# Patient Record
Sex: MALE | Race: WHITE
[De-identification: names, ages, dates, MRNs, and addresses within clinical notes are randomized per-mention and may not be internally consistent; named-entity substitution may affect disease eponyms.]

---

## 2017-06-06 ENCOUNTER — HOSPITAL ENCOUNTER (INPATIENT)
Dept: HOSPITAL 10 - FTE | Age: 32
LOS: 1 days | Discharge: HOME | DRG: 343 | End: 2017-06-07
Attending: INTERNAL MEDICINE | Admitting: SURGERY
Payer: COMMERCIAL

## 2017-06-06 VITALS — HEART RATE: 78 BPM | SYSTOLIC BLOOD PRESSURE: 115 MMHG | DIASTOLIC BLOOD PRESSURE: 72 MMHG | RESPIRATION RATE: 16 BRPM

## 2017-06-06 VITALS — SYSTOLIC BLOOD PRESSURE: 118 MMHG | RESPIRATION RATE: 18 BRPM | DIASTOLIC BLOOD PRESSURE: 75 MMHG | HEART RATE: 68 BPM

## 2017-06-06 VITALS
BODY MASS INDEX: 38.76 KG/M2 | HEIGHT: 68 IN | WEIGHT: 255.74 LBS | BODY MASS INDEX: 38.76 KG/M2 | WEIGHT: 255.74 LBS | HEIGHT: 68 IN

## 2017-06-06 VITALS — DIASTOLIC BLOOD PRESSURE: 45 MMHG | SYSTOLIC BLOOD PRESSURE: 101 MMHG | RESPIRATION RATE: 18 BRPM | HEART RATE: 88 BPM

## 2017-06-06 VITALS — RESPIRATION RATE: 17 BRPM | DIASTOLIC BLOOD PRESSURE: 41 MMHG | HEART RATE: 86 BPM | SYSTOLIC BLOOD PRESSURE: 106 MMHG

## 2017-06-06 VITALS — RESPIRATION RATE: 18 BRPM | SYSTOLIC BLOOD PRESSURE: 113 MMHG | HEART RATE: 84 BPM | DIASTOLIC BLOOD PRESSURE: 63 MMHG

## 2017-06-06 VITALS — HEART RATE: 84 BPM | RESPIRATION RATE: 17 BRPM | DIASTOLIC BLOOD PRESSURE: 45 MMHG | SYSTOLIC BLOOD PRESSURE: 100 MMHG

## 2017-06-06 VITALS — RESPIRATION RATE: 17 BRPM | DIASTOLIC BLOOD PRESSURE: 61 MMHG | HEART RATE: 84 BPM | SYSTOLIC BLOOD PRESSURE: 111 MMHG

## 2017-06-06 VITALS — DIASTOLIC BLOOD PRESSURE: 58 MMHG | RESPIRATION RATE: 16 BRPM | HEART RATE: 91 BPM | SYSTOLIC BLOOD PRESSURE: 124 MMHG

## 2017-06-06 VITALS — HEART RATE: 84 BPM | RESPIRATION RATE: 15 BRPM | DIASTOLIC BLOOD PRESSURE: 57 MMHG | SYSTOLIC BLOOD PRESSURE: 109 MMHG

## 2017-06-06 VITALS — DIASTOLIC BLOOD PRESSURE: 61 MMHG | HEART RATE: 88 BPM | RESPIRATION RATE: 19 BRPM | SYSTOLIC BLOOD PRESSURE: 108 MMHG

## 2017-06-06 VITALS — DIASTOLIC BLOOD PRESSURE: 64 MMHG | SYSTOLIC BLOOD PRESSURE: 108 MMHG | HEART RATE: 86 BPM | RESPIRATION RATE: 20 BRPM

## 2017-06-06 VITALS — HEART RATE: 84 BPM | SYSTOLIC BLOOD PRESSURE: 111 MMHG | DIASTOLIC BLOOD PRESSURE: 46 MMHG | RESPIRATION RATE: 18 BRPM

## 2017-06-06 VITALS — DIASTOLIC BLOOD PRESSURE: 58 MMHG | SYSTOLIC BLOOD PRESSURE: 111 MMHG | RESPIRATION RATE: 17 BRPM | HEART RATE: 82 BPM

## 2017-06-06 VITALS — DIASTOLIC BLOOD PRESSURE: 47 MMHG | HEART RATE: 76 BPM | RESPIRATION RATE: 17 BRPM | SYSTOLIC BLOOD PRESSURE: 100 MMHG

## 2017-06-06 VITALS — DIASTOLIC BLOOD PRESSURE: 65 MMHG | RESPIRATION RATE: 18 BRPM | SYSTOLIC BLOOD PRESSURE: 113 MMHG

## 2017-06-06 VITALS — DIASTOLIC BLOOD PRESSURE: 58 MMHG | RESPIRATION RATE: 20 BRPM | HEART RATE: 92 BPM | SYSTOLIC BLOOD PRESSURE: 119 MMHG

## 2017-06-06 VITALS — TEMPERATURE: 98.2 F

## 2017-06-06 DIAGNOSIS — K35.80: Primary | ICD-10-CM

## 2017-06-06 DIAGNOSIS — D72.829: ICD-10-CM

## 2017-06-06 DIAGNOSIS — Z87.891: ICD-10-CM

## 2017-06-06 LAB
ADD SCAN DIFF: NO
ADD UMIC: NO
ALBUMIN SERPL-MCNC: 4.9 G/DL (ref 3.3–4.9)
ALBUMIN/GLOB SERPL: 1.88 {RATIO}
ALP SERPL-CCNC: 100 IU/L (ref 42–121)
ALT SERPL-CCNC: 50 IU/L (ref 13–69)
ANION GAP SERPL CALC-SCNC: 14 MMOL/L (ref 8–16)
APTT BLD: 27.1 SEC (ref 25–35)
AST SERPL-CCNC: 21 IU/L (ref 15–46)
BASOPHILS # BLD AUTO: 0.1 10^3/UL (ref 0–0.1)
BASOPHILS NFR BLD: 0.4 % (ref 0–2)
BILIRUB DIRECT SERPL-MCNC: 0 MG/DL (ref 0–0.2)
BILIRUB SERPL-MCNC: 0.3 MG/DL (ref 0.2–1.3)
BUN SERPL-MCNC: 18 MG/DL (ref 7–20)
CALCIUM SERPL-MCNC: 9.5 MG/DL (ref 8.4–10.2)
CHLORIDE SERPL-SCNC: 105 MMOL/L (ref 97–110)
CO2 SERPL-SCNC: 25 MMOL/L (ref 21–31)
COLOR UR: (no result)
CREAT SERPL-MCNC: 0.74 MG/DL (ref 0.61–1.24)
EOSINOPHIL # BLD: 0.3 10^3/UL (ref 0–0.5)
EOSINOPHIL NFR BLD: 2.4 % (ref 0–7)
ERYTHROCYTE [DISTWIDTH] IN BLOOD BY AUTOMATED COUNT: 12 % (ref 11.5–14.5)
GLOBULIN SER-MCNC: 2.6 G/DL (ref 1.3–3.2)
GLUCOSE SERPL-MCNC: 159 MG/DL (ref 70–220)
GLUCOSE UR STRIP-MCNC: NEGATIVE %
HCT VFR BLD CALC: 45.4 % (ref 42–52)
HGB BLD-MCNC: 16.3 G/DL (ref 14–18)
INR PPP: 0.96
KETONES UR STRIP.AUTO-MCNC: NEGATIVE MG/DL
LYMPHOCYTES # BLD AUTO: 2 10^3/UL (ref 0.8–2.9)
LYMPHOCYTES NFR BLD AUTO: 14.5 % (ref 15–51)
MCH RBC QN AUTO: 30.3 PG (ref 29–33)
MCHC RBC AUTO-ENTMCNC: 35.9 G/DL (ref 32–37)
MCV RBC AUTO: 84.4 FL (ref 82–101)
MONOCYTES # BLD: 0.7 10^3/UL (ref 0.3–0.9)
MONOCYTES NFR BLD: 4.9 % (ref 0–11)
NEUTROPHILS # BLD: 10.7 10^3/UL (ref 1.6–7.5)
NEUTROPHILS NFR BLD AUTO: 77.1 % (ref 39–77)
NITRITE UR QL STRIP.AUTO: NEGATIVE
NRBC # BLD MANUAL: 0 10^3/UL (ref 0–0)
NRBC BLD QL: 0 /100WBC (ref 0–0)
PLATELET # BLD: 205 10^3/UL (ref 140–415)
PMV BLD AUTO: 10.7 FL (ref 7.4–10.4)
POTASSIUM SERPL-SCNC: 4.4 MMOL/L (ref 3.5–5.1)
PROT SERPL-MCNC: 7.5 G/DL (ref 6.1–8.1)
PROTHROMBIN TIME: 12.8 SEC (ref 12.2–14.2)
PT RATIO: 1
RBC # BLD AUTO: 5.38 10^6/UL (ref 4.7–6.1)
RBC # UR AUTO: NEGATIVE /UL
SODIUM SERPL-SCNC: 140 MMOL/L (ref 135–144)
URINE BILIRUBIN (DIP): NEGATIVE
URINE TOTAL PROTEIN (DIP): NEGATIVE
UROBILINOGEN UR STRIP-ACNC: (no result) (ref 0.1–1)
WBC # BLD AUTO: 13.9 10^3/UL (ref 4.8–10.8)
WBC # UR STRIP: NEGATIVE /UL

## 2017-06-06 PROCEDURE — 83036 HEMOGLOBIN GLYCOSYLATED A1C: CPT

## 2017-06-06 PROCEDURE — 84436 ASSAY OF TOTAL THYROXINE: CPT

## 2017-06-06 PROCEDURE — 85730 THROMBOPLASTIN TIME PARTIAL: CPT

## 2017-06-06 PROCEDURE — 85610 PROTHROMBIN TIME: CPT

## 2017-06-06 PROCEDURE — 84100 ASSAY OF PHOSPHORUS: CPT

## 2017-06-06 PROCEDURE — 83735 ASSAY OF MAGNESIUM: CPT

## 2017-06-06 PROCEDURE — 83880 ASSAY OF NATRIURETIC PEPTIDE: CPT

## 2017-06-06 PROCEDURE — 80061 LIPID PANEL: CPT

## 2017-06-06 PROCEDURE — 86900 BLOOD TYPING SEROLOGIC ABO: CPT

## 2017-06-06 PROCEDURE — 80053 COMPREHEN METABOLIC PANEL: CPT

## 2017-06-06 PROCEDURE — 74176 CT ABD & PELVIS W/O CONTRAST: CPT

## 2017-06-06 PROCEDURE — 85025 COMPLETE CBC W/AUTO DIFF WBC: CPT

## 2017-06-06 PROCEDURE — 96375 TX/PRO/DX INJ NEW DRUG ADDON: CPT

## 2017-06-06 PROCEDURE — 84484 ASSAY OF TROPONIN QUANT: CPT

## 2017-06-06 PROCEDURE — 81003 URINALYSIS AUTO W/O SCOPE: CPT

## 2017-06-06 PROCEDURE — 93005 ELECTROCARDIOGRAM TRACING: CPT

## 2017-06-06 PROCEDURE — 0DTJ0ZZ RESECTION OF APPENDIX, OPEN APPROACH: ICD-10-PCS | Performed by: SURGERY

## 2017-06-06 PROCEDURE — 71010: CPT

## 2017-06-06 PROCEDURE — 86850 RBC ANTIBODY SCREEN: CPT

## 2017-06-06 PROCEDURE — 36415 COLL VENOUS BLD VENIPUNCTURE: CPT

## 2017-06-06 PROCEDURE — 83690 ASSAY OF LIPASE: CPT

## 2017-06-06 PROCEDURE — 84443 ASSAY THYROID STIM HORMONE: CPT

## 2017-06-06 PROCEDURE — 84479 ASSAY OF THYROID (T3 OR T4): CPT

## 2017-06-06 PROCEDURE — 96376 TX/PRO/DX INJ SAME DRUG ADON: CPT

## 2017-06-06 PROCEDURE — 96374 THER/PROPH/DIAG INJ IV PUSH: CPT

## 2017-06-06 PROCEDURE — 86901 BLOOD TYPING SEROLOGIC RH(D): CPT

## 2017-06-06 PROCEDURE — 96361 HYDRATE IV INFUSION ADD-ON: CPT

## 2017-06-06 RX ADMIN — DEXTROSE, SODIUM CHLORIDE, AND POTASSIUM CHLORIDE SCH MLS/HR: 5; .45; .15 INJECTION INTRAVENOUS at 21:41

## 2017-06-06 NOTE — ERA
ER Documentation


Chief Complaint


Date/Time


DATE: 6/6/17 


TIME: 07:25


Chief Complaint


RLQ abd pain,NV,diarrhea





HPI


This is a 31-year-old male presenting to the emergency department complaining 

of severe right lower quadrant abdominal pain since 1:00 this morning.  Patient 

rates the pain as sharp, constant, 9 out of 10.  He states that he had a few 

episodes of nonbilious nonbloody vomiting and diarrhea started this morning.  

Patient denies any fevers, he admits to having chills.  He states his last meal 

was 8 PM.  He denies any medications.  Denies any past abdominal surgeries





ROS


All systems reviewed and are negative except as per history of present illness.





Allergies


Allergies:  


Coded Allergies:  


     No Known Allergy (Unverified , 6/6/17)





PMhx/Soc


Medical and Surgical Hx:  pt denies Medical Hx, pt denies Surgical Hx


Hx Alcohol Use:  Yes


Hx Substance Use:  No


Hx Tobacco Use:  Yes


Smoking Status:  Former smoker





Physical Exam


Vitals





Vital Signs








  Date Time  Temp Pulse Resp B/P Pulse Ox O2 Delivery O2 Flow Rate FiO2


 


6/6/17 05:27 97.6 82 18 143/93 98   








Physical Exam





GENERAL: well-developed/well-nourished, in no apparent distress, non-toxic 

appearing


HENT: NC/AT, moist mucous membranes


EYES: Conjunctiva normal


NECK: Supple, no lymphadenopathy


PULM: CTA bilaterally, no rales, rhonchi, or wheezing heard 


CV: Normal S1S2, RRR, good capillary refill


GI: Soft, non-distended, tender to palpation right lower quadrant


      Normal bowel sounds, no masses or organomegaly felt on exam


      No gross peritonitis, no bruits


      Negative Rovsing, negative Resendez, positive McBurney's point,     


      Negative CVAT


BACK: No masses


EXT: No clubbing, cyanosis, or edema


NEURO: Alert and Orientated


SKIN: Intact, normal turgor


PSYCH: Normal mood and mentation


Result Diagram:  


6/6/17 0630                                                                    

            6/6/17 0630





Results 24 hrs





 Laboratory Tests








Test


  6/6/17


06:30


 


White Blood Count 13.910^3/ul 


 


Red Blood Count 5.3810^6/ul 


 


Hemoglobin 16.3g/dl 


 


Hematocrit 45.4% 


 


Mean Corpuscular Volume 84.4fl 


 


Mean Corpuscular Hemoglobin 30.3pg 


 


Mean Corpuscular Hemoglobin


Concent 35.9g/dl 


 


 


Red Cell Distribution Width 12.0% 


 


Platelet Count 87756^3/UL 


 


Mean Platelet Volume 10.7fl 


 


Neutrophils % 77.1% 


 


Lymphocytes % 14.5% 


 


Monocytes % 4.9% 


 


Eosinophils % 2.4% 


 


Basophils % 0.4% 


 


Nucleated Red Blood Cells % 0.0/100WBC 


 


Neutrophils # 10.710^3/ul 


 


Lymphocytes # 2.010^3/ul 


 


Monocytes # 0.710^3/ul 


 


Eosinophils # 0.310^3/ul 


 


Basophils # 0.110^3/ul 


 


Nucleated Red Blood Cells # 0.010^3/ul 


 


Urine Color LT. YELLOW 


 


Urine Clarity CLEAR 


 


Urine pH 6.0 


 


Urine Specific Gravity 1.025 


 


Urine Ketones NEGATIVE 


 


Urine Nitrite NEGATIVE 


 


Urine Bilirubin NEGATIVE 


 


Urine Urobilinogen 0.2  E.U./dL 


 


Urine Leukocyte Esterase NEGATIVE 


 


Urine Hemoglobin NEGATIVE 


 


Urine Glucose NEGATIVE% 


 


Urine Total Protein NEGATIVE 


 


Sodium Level 140mmol/L 


 


Potassium Level 4.4mmol/L 


 


Chloride Level 105mmol/L 


 


Carbon Dioxide Level 25mmol/L 


 


Anion Gap 14 


 


Blood Urea Nitrogen 18mg/dl 


 


Creatinine 0.74mg/dl 


 


Glucose Level 159mg/dl 


 


Calcium Level 9.5mg/dl 


 


Total Bilirubin 0.3mg/dl 


 


Direct Bilirubin 0.00mg/dl 


 


Indirect Bilirubin 0.3mg/dl 


 


Aspartate Amino Transf


(AST/SGOT) 21IU/L 


 


 


Alanine Aminotransferase


(ALT/SGPT) 50IU/L 


 


 


Alkaline Phosphatase 100IU/L 


 


Total Protein 7.5g/dl 


 


Albumin 4.9g/dl 


 


Globulin 2.60g/dl 


 


Albumin/Globulin Ratio 1.88 


 


Lipase 124U/L 








 Current Medications








 Medications


  (Trade)  Dose


 Ordered  Sig/Tory


 Route


 PRN Reason  Start Time


 Stop Time Status Last Admin


Dose Admin


 


 Sodium Chloride


  (NS)  1,000 ml @ 


 1,000 mls/hr  Q1H STAT


 IV


   6/6/17 06:23


 6/6/17 07:22 DC 6/6/17 06:42


 


 


 Hydromorphone HCl


  (Dilaudid)  1 mg  ONCE  STAT


 IV


   6/6/17 06:23


 6/6/17 06:25 DC 6/6/17 06:42


 


 


 Ondansetron HCl


  (Zofran Inj)  4 mg  ONCE  STAT


 IV


   6/6/17 06:23


 6/6/17 06:25 DC 6/6/17 06:42


 


 


 Hydromorphone HCl


 0.5 mg  0.5 mg  ONCE  STAT


 IV


   6/6/17 07:31


 6/6/17 07:33 DC 6/6/17 07:50


 


 


 Ampicillin Sodium/


 Sulbactam Sodium


  (Unasyn 3gm/NS


  (Pmx))  100 ml @ 


 100 mls/hr  ONCE  ONCE


 IVPB


   6/6/17 08:00


 6/6/17 08:59  6/6/17 08:01


 


 


 Ondansetron HCl


  (Zofran Inj)  4 mg  BRIDGE ORDER PRN


 IV


 NAUSEA AND/OR VOMITING  6/6/17 08:00


 6/7/17 07:59   


 


 


 Acetaminophen


  (Tylenol Tab)  650 mg  ER BRIDGE PRN


 PO


 MILD PAIN/FEVER  6/6/17 08:00


 6/7/17 07:59   


 











Procedures/MDM


This is a 31-year-old male presenting to the emergency department with right 

lower quadrant pain most consistent with acute appendicitis, without any 

evidence of perforation or abscess who will need to get admitted for surgery.  

There was no evidence of sepsis.  Patient presented with right lower quadrant 

abdominal pain, vomiting, and diarrhea. He as afebrile, leukocytosis of 14. CMP 

and UA unremarkable.  IV access was established, patient was given 1 L of fluids

, Zosyn and Dilaudid, Zofran.  Patient has been stabilized in the ED.  





I have consulted my supervising physician Dr. Zuniga who will consulted the 

hospitalist  for admission and  for GI surgery to prepare him 

for surgery. His last meal was 8p





CT abd and pelvis without contrast:


Appendicitis. There is a distended and fluid filled appendix extending inferior 

to the cecum measuring up to 12 mm in diameter


Small umbilical and left inguinal hernias containing fat.


Mild bibasilar atelectasis.





Departure


Diagnosis:  


 Primary Impression:  


 Appendicitis


Condition:  Serious











EDILBERTO WHELAN PA-C Jun 6, 2017 07:25

## 2017-06-06 NOTE — CONS
Date/Time of Note


Date/Time of Note


DATE: 17 


TIME: 17:25





Assessment/Plan


Assessment/Plan


Additional Assessment/Plan








[facility] INITIAL INPATIENT CONSULTATION  NOTE


 


ASSESSMENT AND PLAN:  A very-pleasant 31-year-old gentleman with comorbidities 

including BMI 38.9, who was admitted to [facility] through ED on with signs and 

symptoms consistent with acute appendicitis.  I recommended laparoscopic, 

possible open appendectomy.  I explained the anatomy as well as the natural 

history and pathophysiology of this disease to the patient and family in detail 

and reviewed the reasoning behind my recommendations.  Patient and family 

appear to understand and wish to proceed.


 


With above assessment, I've recommended the followin.  To the operating room for above


 


Thank you very much for having me involved in the care of this very pleasant 

gentleman and his wonderful family. I will continue to follow him along with 

you closely and will be available to answer any questions at area code [phone]

[phone].


 


TOTAL VISIT TIME: 45 minutes of which more than half was spent in face-to-face 

discussion with the patient, discussions with family, as well as coordination 

of care between multiple physicians and providers.





Disclaimer: Inadvertent spelling and grammatical errors are likely due to EHR/

dictation software use and do not reflect on the quality of delivered patient 

care. Also, please note that the electronic time recorded on this node does not 

necessarily reflect the actual time of the visit.





Updated clinical summary:





Very pleasant 31-year-old gentleman with comorbidities including BMI 38.9 

presenting with signs and symptoms consistent with acute appendicitis through 

emergency department at [facility] on 2017.





Comorbidities:





1.  BMI 30.9


2.  Former smoker


3.  Status post left elbow surgery more than 20 years ago


4.  Status post 2 other surgeries (minor)





HISTORY OF PRESENT ILLNESS:  The patient is a very pleasant 31-year-old 

gentleman with above-mentioned comorbidities who were kindly asked to consult 

regarding management of his abdominal pain that was consistent with acute 

appendicitis as shown by his history of right lower quadrant abdominal pain 

that started last night and was associated with nausea and vomiting, his 

elevated white blood cell count and his CT findings.


 


ALLERGIES: NO KNOWN DRUG ALLERGIES


 


MEDICATIONS


None


 


SOCIAL HISTORY:  The patient lives with family.-Tob (former);-ETOH;-IVDU


 


FAMILY HISTORY: There are no significant medical, surgical or oncologic issues 

in the family as reported by the patient or reflected in the chart.


 


REVIEW OF SYSTEMS:


Other than mentioned above, there were no other pertinent positives or 

pertinent negatives in an otherwise complete 14 point review of systems.


 


PHYSICAL EXAMINATION


GENERAL:  The patient appears to be a very pleasant gentleman of  

descent lying in bed, appearing stated age, and otherwise in no acute distress.

  BMI: 38.9


VITAL SIGNS: AVSS (please also see below)


HEENT: Normocephalic and atraumatic.  Extraocular muscles and hearing are 

grossly intact bilaterally and symmetrically.  Sclerae are nonicteric.  Oral 

cavity is clear; oral mucosa appear to be pink and moist.  Dentition: fair.


NECK: Supple.  There is no lymphadenopathy or JVD.  There is no submental, 

submandibular or supraclavicular lymphadenopathy.


CHEST: Rises symmetrically with each breath; patient is breathing comfortably.  

There are no audible wheezes, rales or rhonchi on the gross exam.


HEART:  Pulse is regular and palpable on the right wrist.  Capillary refill is 

normal.  Carotid pulses are palpable bilaterally and symmetrically in the neck.


EXTREMITIES:  Lower extremities contain no pitting edema around the ankles 

bilaterally and symmetrically.


ABDOMEN:  Abdomen is soft, tender to palpation in the right lower quadrant and 

nondistended.  No evidence of ascites, organomegaly, caput medusae, engorged 

subcutaneous veins, or other abnormalities.  There are no peritoneal signs or 

guarding.


SKIN:  Appears to be pink and feels warm to touch.


NEUROLOGIC: Awake, alert, and follows commands appropriately.


 


LABORATORY DATA:  See below


 


IMAGING:  See electronic chart. Please note that I've personally reviewed all 

pertinent available images and I agree in general with their overall reported 

findings.





Consultation Date/Type/Reason


Admit Date/Time








Social History


Smoking Status:  Former smoker





Exam/Review of Systems


Vital Signs


Vitals





 Vital Signs








  Date Time  Temp Pulse Resp B/P Pulse Ox O2 Delivery O2 Flow Rate FiO2


 


17 15:29 98.2 90 16 128/68 98 Nasal Cannula 2.0 











Results


Result Diagram:  


17 0630                                                                    

            17 0630





Results 24 hrs





Laboratory Tests








Test


  17


06:30


 


White Blood Count 13.9  H


 


Red Blood Count 5.38  


 


Hemoglobin 16.3  


 


Hematocrit 45.4  


 


Mean Corpuscular Volume 84.4  


 


Mean Corpuscular Hemoglobin 30.3  


 


Mean Corpuscular Hemoglobin


Concent 35.9  


 


 


Red Cell Distribution Width 12.0  


 


Platelet Count 205  


 


Mean Platelet Volume 10.7  H


 


Neutrophils % 77.1  H


 


Lymphocytes % 14.5  L


 


Monocytes % 4.9  


 


Eosinophils % 2.4  


 


Basophils % 0.4  


 


Nucleated Red Blood Cells % 0.0  


 


Neutrophils # 10.7  H


 


Lymphocytes # 2.0  


 


Monocytes # 0.7  


 


Eosinophils # 0.3  


 


Basophils # 0.1  


 


Nucleated Red Blood Cells # 0.0  


 


Prothrombin Time 12.8  


 


Prothrombin Time Ratio 1.0  


 


INR International Normalized


Ratio 0.96  


 


 


Activated Partial


Thromboplast Time 27.1  


 


 


Urine Color LT. YELLOW  


 


Urine Clarity CLEAR  


 


Urine pH 6.0  


 


Urine Specific Gravity 1.025  


 


Urine Ketones NEGATIVE  


 


Urine Nitrite NEGATIVE  


 


Urine Bilirubin NEGATIVE  


 


Urine Urobilinogen 0.2  E.U./dL  


 


Urine Leukocyte Esterase NEGATIVE  


 


Urine Hemoglobin NEGATIVE  


 


Urine Glucose NEGATIVE  


 


Urine Total Protein NEGATIVE  


 


Sodium Level 140  


 


Potassium Level 4.4  


 


Chloride Level 105  


 


Carbon Dioxide Level 25  


 


Anion Gap 14  


 


Blood Urea Nitrogen 18  


 


Creatinine 0.74  


 


Glucose Level 159  


 


Calcium Level 9.5  


 


Total Bilirubin 0.3  


 


Direct Bilirubin 0.00  


 


Indirect Bilirubin 0.3  


 


Aspartate Amino Transf


(AST/SGOT) 21  


 


 


Alanine Aminotransferase


(ALT/SGPT) 50  


 


 


Alkaline Phosphatase 100  


 


Troponin I < 0.012  


 


Total Protein 7.5  


 


Albumin 4.9  


 


Globulin 2.60  


 


Albumin/Globulin Ratio 1.88  


 


Lipase 124  











Medications


Medications





 Current Medications


Sodium Chloride (NS) 1,000 ml @  80 mls/hr [id number] IV ;  Start 17 at 12:55


Ondansetron HCl (Zofran Inj) 4 mg Q6H  PRN IV NAUSEA AND/OR VOMITING;  Start  at 13:00


Acetaminophen (Tylenol Tab) 650 mg Q6H  PRN PO PAIN LEVEL 1-3 OR FEVER;  Start  at 13:00


Acetaminophen (Tylenol Supp) 650 mg Q6H  PRN AR PAIN LEVEL 1-3 OR FEVER;  Start 

17 at 13:00


Acetaminophen/ Hydrocodone Bitart (Norco (5/325)) 1 tab Q6H  PRN PO MODERATE 

PAIN LEVEL 4-6;  Start 17 at 13:00


Acetaminophen/ Hydrocodone Bitart (Norco (5/325)) 2 tab Q6H  PRN PO SEVERE PAIN 

LEVEL 7-10;  Start 17 at 13:00


Morphine Sulfate (morphine) 2 mg Q4H  PRN IV SEVERE PAIN LEVEL 7-10;  Start  at 13:00


Docusate Sodium (Colace) 100 mg Q12H  PRN PO CONSTIPATION;  Start 17 at 13:

00


Magnesium Hydroxide (Milk Of Mag) 30 ml DAILY  PRN PO CONSTIPATION;  Start  at 13:00


Bisacodyl (Dulcolax Supp) 10 mg DAILY  PRN AR CONSTIPATION;  Start 17 at 13:

00


Pantoprazole 40 mg 40 mg DAILY@06 IV ;  Start 17 at 06:00


Ampicillin Sodium/ Sulbactam Sodium (Unasyn 1.5gm/NS (Pmx)) 50 ml @  100 mls/hr 

Q6 IVPB  Last administered on t 17:01; Admin Dose 100 MLS/HR;  Start  at 18:00











JAYCEE LUIS M.D. 2017 18:30

## 2017-06-06 NOTE — RADRPT
PROCEDURE:   CT Abdomen and pelvis without contrast. 

 

CLINICAL INDICATION:   Abdominal pain. 

 

TECHNIQUE:    CT scan of the abdomen and pelvis was performed on a multi-detector high-resolution CT
 scanner.  Contiguous axial images were obtained from the lung bases to the ischial tuberosities wit
hout intravenous contrast.  Coronal and sagittal reformatted images were also obtained.  Images were
 reviewed on the PACS workstation.

 

One or more of the following dose reduction techniques were used:

- Automated exposure control.

- Adjustment of the mA and/or kV according to patient size.

- Use of iterative reconstruction technique.

 

Exam CTD/vol = 23.12 mGy.

Total exam DLP = 1622.94 mGy-cm.   

 

COMPARISON:   None. 

 

FINDINGS:

Evaluation of the lung bases demonstrates mild bibasilar atelectasis.

 

Abdomen:  The liver is normal in size.  There is no focal mass or dilatation of the biliary tree.  T
he gallbladder is not distended.  The spleen, pancreas and bilateral adrenal glands are within gemini
l limits.  Bilateral kidneys are normal in size with a small cyst within the upper pole of the right
 kidney.  There is no radiopaque renal or ureteral calculus identified.  There is no hydronephrosis 
or hydroureter.  There is no retroperitoneal adenopathy.  The abdominal aorta is of normal caliber.

 

There is a small umbilical hernia containing fat.  There is a distended and fluid filled appendix ex
tending inferior to the cecum measuring up to 12 mm in diameter.  There is no bowel obstruction or f
ree air.  There is no diverticulosis or diverticulitis.  There is no ascites. There is a retroaortic
 left renal vein.

 

Pelvis:  The bladder is unremarkable.  There is a small left inguinal hernia containing fat.  The pr
ostate and seminal vesicles are within normal limits.  There is no significant pelvic adenopathy or 
free fluid.

 

Evaluation of the osseous structures demonstrates no suspicious lytic or blastic lesion. 

 

IMPRESSION:

Appendicitis.

Small umbilical and left inguinal hernias containing fat.

Mild bibasilar atelectasis. 

 

A call report was made to FELIPE Lopez at 07:25 a.m.

_____________________________________________ 

.Valdo Russ MD, MD           Date    Time 

Electronically viewed and signed by .Valdo Russ MD, MD on 06/06/2017 07:27 

 

D:  06/06/2017 07:27  T:  06/06/2017 07:27

.T/

## 2017-06-06 NOTE — QN
Documentation


Comment


The patient has acute appendicitis based on CT scan results and elevated white 

blood cell count.  The patient was given Unasyn IV.  The patient will be 

admitted to the care of the panel team and I have placed a call to Dr. Stoddard 

who is a surgeon on-call.  I am awaiting a callback at this time.











DESIREE COLIN MD Jun 6, 2017 07:48

## 2017-06-06 NOTE — RADRPT
PROCEDURE:   XR Chest. 

 

CLINICAL INDICATION:   Chest pain 

 

TECHNIQUE:   Single portable view  of the chest was obtained 

 

COMPARISON:   None 

 

FINDINGS:

The heart and mediastinum are within normal limits.  

There are mild bibasilar atelectatic changes.  

The lungs are otherwise clear.

There is no pleural effusion or pneumothorax.   

 

RPTAT: AA

 

IMPRESSION:

Mild linear bibasilar atelectatic changes.

_____________________________________________ 

.Matthew Osman MD, MD           Date    Time 

Electronically viewed and signed by .Matthew Osman MD, MD on 06/06/2017 08:41 

 

D:  06/06/2017 08:41  T:  06/06/2017 08:41

.S/

## 2017-06-06 NOTE — HPN
Date/Time of Note


Date/Time of Note


DATE: 6/6/17 


TIME: 16:56





Interval H&P Admission Note


Pt. seen H&P reviewed:  No system changes


Pt. seen H&P reviewed. No system changes (I attest that I have seen and 

examined the patient and reviewed the operation in detail, as well as its risks

, benefits and alternatives of the operation).





I attest that I have seen and examined the patient and reviewed in detail the 

operation, and its associated risks, benefits and alternative. I have answered 

all the patient's questions to the best of my ability and the patient wishes to 

proceed.





Please refer to rest of electronic medical record for additional updates.











JAYCEE LUIS M.D. Jun 6, 2017 16:56

## 2017-06-06 NOTE — OPR
Date/Time of Note


Date/Time of Note


DATE: 6/6/17 


TIME: 18:33





Operative Report


Operative\Procedure Findings





[facility] INPATIENT OPERATIVE NOTE





PLACE OF SERVICE: [facility]





DATE OF SURGERY: 6/6/2017





PREOPERATIVE DIAGNOSIS:


1. Acute appendicitis


2.  BMI 30.9


3.  Status post left elbow surgery more than 20 years ago


4.  Status post 2 other surgeries (minor)


5.  Former smoker





POSTOPERATIVE DIAGNOSIS:


1. Acute appendicitis


2.  BMI 30.9


3.  Status post left elbow surgery more than 20 years ago


4.  Status post 2 other surgeries (minor)


5.  Former smoker





OPERATION:


1. Laparoscopic appendectomy





SURGEON: Jaycee Luis M.D.





ASSISTANT:


None





ANESTHESIA: General endotracheal tube anesthesia





ANESTHESIOLOGIST: JAMSHID Aguirre M.D.





BRIEF SUMMARY: An otherwise uncomplicated laparoscopic appendectomy was 

performed with findings of non-perforated appendicitis.





Updated clinical summary:





Very pleasant 31-year-old gentleman with comorbidities including BMI 38.9 

presenting with signs and symptoms consistent with acute appendicitis through 

emergency department at [facility] on 6/6/2017.





Comorbidities:





1.  BMI 30.9


2.  Former smoker


3.  Status post left elbow surgery more than 20 years ago


4.  Status post 2 other surgeries (minor)





BRIEF HISTORY: The patient is a very pleasant 31-year-old gentleman with above-

mentioned history and comorbidities who presented with signs and symptoms 

consistent with acute appendicitis. I met with the patient and family and 

counseled them regarding the possible options of treatment, and I strongly 

suggested a laparoscopic, possible open appendectomy. We reviewed the operation 

in detail as well as the risks, benefits, alternatives, and expected outcomes 

of this operation. After careful consideration of all the risks, benefits, and 

alternatives, the patient and family appeared to understand those risks and 

wished to proceed with surgery. For a detailed report of my consultation with 

patient and family, please refer to my separate consultation note.





STATEMENT OF THE INFORMED CONSENT: The patient and family appeared to 

understand the risks of the operation to include, but not be limited to risk of 

postoperative pain and scar tissue, possible infection or bleeding requiring 

other interventions such as opening the wound, placement of drainage catheters, 

or other operative interventions; possible injury to surrounding to structures 

including bowel, bladder, bile duct, or blood vessels, or solid organs such as 

liver, kidney, or pancreas requiring other interventions or procedures; 

possible leakage of bowel from anastomotic sites or suture lines causing 

significant increase in morbidity and mortality and requiring multiple 

interventions including but not limited to, placement of drainage catheters, 

imaging studies, as well as operative interventions; possible other source of 

sepsis such as urinary tract infections or pneumonias, or other sources of 

potentially life threatening problems such as deep venous thrombus formation 

causing pulmonary embolism, myocardial arrhythmias and infarctions, and even 

death.





After careful consideration of all their options, the patient and family 

appeared to understand and wished to proceed with surgery.





DESCRIPTION OF PROCEDURE: After obtaining informed consent, the patient was 

brought into the operating room and was placed in a normal supine position, 

where successful general endotracheal tube anesthesia was performed. 

Intravenous access was already in place and intravenous antimicrobials had been 

appropriately chosen and dosed prior to the operation. The patient's abdominal 

skin was prepped and draped from the nipple line down to the level of the upper 

thighs in the usual sterile fashion. We then called a surgical time-out where 

the patient's identification, date of birth, nature of the operation, allergies

, presence of intravenous antimicrobials, presence of needed equipment, and any 

other concerns were reviewed and agreed upon by all members of the operating 

room team.


 


We then started the operation by placing a 5 mm skin incision in the left lower 

quadrant and then introduced a 5 mm [employer] trocar into the peritoneal 

space, visualizing all the layers of the abdominal wall as we entered. Note 

that there was no indication of any injury to underlying structures with our 

entry into the peritoneal space. We insufflated the abdominal cavity to a 

maximum pressure of 15 mmHg and again inspected the area of insertion and 

ensured no obvious injury to underlying structures prior to inspecting the 

abdominal cavity and showing no obvious  pus, bowel contents, or other abnormal 

features.  We could not see the appendix very well.  We, therefore, injected 

the future sites of our other trocars with 0.25% Marcaine with epinephrine and 

placed a 5 mm [employer] trocar into the midline suprapubic area, taking 

care not to injure the bladder. We also placed a 12 mm trocar in the umbilical 

midline area, all under direct visualization. With our instruments in place, we 

had excellent visualization and access to the right lower quadrant.


 


We then identified the appendix, which was inflamed but had a normal base 

coming out of the cecum. I then went ahead and used judicious amount of cautery 

as well as mostly blunt dissection to circumferentially isolate the base of the 

appendix and then transected this using one firing of the white load of the Endo

-LEXIE stapler. We also repeated the firing on the mesentery of the appendix and 

completely disconnected the organ from the colon, delivered this out through 

the 12 mm trocar site inside of an EndoCatch bag without having to enlarge the 

fascial defect as well as without contaminating the wound. The specimen was 

sent to Pathology for further analysis. We then ensured adequate hemostasis and 

bile stasis, removed all our equipment including the pneumoperitoneum from the 

abdominal cavity prior to closing the infraumbilical fascia with 1 figure-of-

eight 0 Vicryl suture on a UR-6 needle, washing the wounds with copious amounts 

of normal saline, injecting the initial insertion point of the trocar with 0.25

% Marcaine with epinephrine, and then closing the skin using interrupted 4-0 

Monocryl sutures. Light dressing was then applied.


 


At the end of the operation, both the sponge count and needle count were 

reportedly correct x2.





The patient tolerated the procedure without any reported complications.





ESTIMATED BLOOD LOSS: Less than 10 mL.





BLOOD OR BLOOD PRODUCT TRANSFUSIONS: None to my knowledge.





SPECIMENS:


1. Appendix





COMPLICATIONS: None.





DISPOSITION: Recovery area.





Disclaimer: Inadvertent spelling and grammatical errors are likely due to EHR/

dictation software use and do not reflect on the quality of delivered patient 

care.











JAYCEE LUIS M.D. Jun 6, 2017 18:33

## 2017-06-07 VITALS — SYSTOLIC BLOOD PRESSURE: 122 MMHG | DIASTOLIC BLOOD PRESSURE: 64 MMHG | RESPIRATION RATE: 18 BRPM

## 2017-06-07 LAB
ADD SCAN DIFF: NO
ALBUMIN SERPL-MCNC: 4 G/DL (ref 3.3–4.9)
ALBUMIN/GLOB SERPL: 1.81 {RATIO}
ALP SERPL-CCNC: 58 IU/L (ref 42–121)
ALT SERPL-CCNC: 42 IU/L (ref 13–69)
ANION GAP SERPL CALC-SCNC: 14 MMOL/L (ref 8–16)
APTT BLD: 28.4 SEC (ref 25–35)
AST SERPL-CCNC: 18 IU/L (ref 15–46)
BASOPHILS # BLD AUTO: 0 10^3/UL (ref 0–0.1)
BASOPHILS NFR BLD: 0.1 % (ref 0–2)
BILIRUB DIRECT SERPL-MCNC: 0 MG/DL (ref 0–0.2)
BILIRUB SERPL-MCNC: 0.5 MG/DL (ref 0.2–1.3)
BNP SERPL-MCNC: 238 PG/ML (ref 0–125)
BUN SERPL-MCNC: 13 MG/DL (ref 7–20)
CALCIUM SERPL-MCNC: 8.7 MG/DL (ref 8.4–10.2)
CHLORIDE SERPL-SCNC: 108 MMOL/L (ref 97–110)
CHOLEST SERPL-MCNC: 165 MG/DL (ref 100–200)
CHOLEST/HDLC SERPL: 4.7 RATIO
CO2 SERPL-SCNC: 23 MMOL/L (ref 21–31)
CREAT SERPL-MCNC: 0.69 MG/DL (ref 0.61–1.24)
EOSINOPHIL # BLD: 0 10^3/UL (ref 0–0.5)
EOSINOPHIL NFR BLD: 0.1 % (ref 0–7)
ERYTHROCYTE [DISTWIDTH] IN BLOOD BY AUTOMATED COUNT: 12.2 % (ref 11.5–14.5)
GLOBULIN SER-MCNC: 2.2 G/DL (ref 1.3–3.2)
GLUCOSE SERPL-MCNC: 221 MG/DL (ref 70–220)
HCT VFR BLD CALC: 39.7 % (ref 42–52)
HDLC SERPL-MCNC: 35 MG/DL (ref 28–63)
HGB BLD-MCNC: 14 G/DL (ref 14–18)
INR PPP: 1.1
LYMPHOCYTES # BLD AUTO: 0.7 10^3/UL (ref 0.8–2.9)
LYMPHOCYTES NFR BLD AUTO: 5.7 % (ref 15–51)
MAGNESIUM SERPL-MCNC: 2 MG/DL (ref 1.7–2.5)
MCH RBC QN AUTO: 30.3 PG (ref 29–33)
MCHC RBC AUTO-ENTMCNC: 35.3 G/DL (ref 32–37)
MCV RBC AUTO: 85.9 FL (ref 82–101)
MONOCYTES # BLD: 0.4 10^3/UL (ref 0.3–0.9)
MONOCYTES NFR BLD: 3.2 % (ref 0–11)
NEUTROPHILS # BLD: 11.7 10^3/UL (ref 1.6–7.5)
NEUTROPHILS NFR BLD AUTO: 90.3 % (ref 39–77)
NRBC # BLD MANUAL: 0 10^3/UL (ref 0–0)
NRBC BLD QL: 0 /100WBC (ref 0–0)
PHOSPHATE SERPL-MCNC: 3.3 MG/DL (ref 2.5–4.9)
PLATELET # BLD: 181 10^3/UL (ref 140–415)
PMV BLD AUTO: 11.3 FL (ref 7.4–10.4)
POTASSIUM SERPL-SCNC: 4.3 MMOL/L (ref 3.5–5.1)
PROT SERPL-MCNC: 6.2 G/DL (ref 6.1–8.1)
PROTHROMBIN TIME: 14.2 SEC (ref 12.2–14.2)
PT RATIO: 1.1
RBC # BLD AUTO: 4.62 10^6/UL (ref 4.7–6.1)
SODIUM SERPL-SCNC: 141 MMOL/L (ref 135–144)
T3RU NFR SERPL: 35.7 % (ref 23.5–40.5)
TRIGL SERPL-MCNC: 170 MG/DL (ref 0–149)
TSH SERPL-ACNC: 0.18 MIU/L (ref 0.47–4.68)
WBC # BLD AUTO: 13 10^3/UL (ref 4.8–10.8)

## 2017-06-07 RX ADMIN — DEXTROSE, SODIUM CHLORIDE, AND POTASSIUM CHLORIDE SCH MLS/HR: 5; .45; .15 INJECTION INTRAVENOUS at 11:31

## 2017-06-07 RX ADMIN — DEXTROSE, SODIUM CHLORIDE, AND POTASSIUM CHLORIDE SCH MLS/HR: 5; .45; .15 INJECTION INTRAVENOUS at 06:03

## 2017-06-07 NOTE — PDOCDIS
Discharge Instructions


CONDITION


Patient Condition:  Stable





HOME CARE INSTRUCTIONS:


Diet Instructions:  Regular





ACTIVITY:








Activity Restrictions:   Slowly Increase Activity











FOLLOW UP/APPOINTMENTS


Appointments


Take your medications as prescribed, see your doctor in the clinic in 1 week.











NAINA KAISER Jun 7, 2017 10:10

## 2017-06-07 NOTE — DS
DATE OF ADMISSION: 06/07/2017

DATE OF DISCHARGE: 06/07/2017

 

This is a 31-year-old male originally admitted on 06/06/2017 and being discharged home on June 7, pe
nding surgery clearance as well.  Patient came in with abdominal pain.  Patient was found with acute
 appendicitis.  The patient was admitted to med/surg floor, underwent a surgical operation and lapar
oscopic appendectomy.  Patient tolerated the procedure well.  The patient had a slight leukocytosis 
on the day of discharge, but no signs of any fevers and was able to ambulate and tolerate a p.o. die
t.  Vital signs were stable and if he gets clearance from the surgery team may be discharged home to
day in improved condition.  He will be sent with Norco 5/325 one tab p.o. q.6 hours p.r.n. He will n
eed to follow up with primary care doctor in the clinic in the next 1 to 2 weeks.

 

FINAL DIAGNOSES:

1.  Abdominal pain secondary to acute appendicitis, status post laparoscopic appendectomy.

2.  Mild leukocytosis, likely secondary to reactive from the surgery.  No signs of any fever.

 

Time spent discharging patient 35 minutes.

 

 

Dictated By: NAINA FRITZ/NIKOLAI

DD:    06/07/2017 10:14:01

DT:    06/07/2017 10:57:51

Conf#: [id number]

DID#:  [id number]

## 2017-06-07 NOTE — HP
Date/Time of Note


Date/Time of Note


DATE: 6/6/17 


TIME: 23:00





Assessment/Plan


VTE Prophylaxis


VTE Prophylaxis Intervention:  SCD's





Lines/Catheters


IV Catheter Type (from Nrsg):  Peripheral IV





Assessment/Plan


Assessment/Plan





IMPRESSION


30 yo male with no significant medical hx presented with abd pain and was 

diagnosed with acute appedicitis, now s/p lap appy





PLAN


advance diet as tolerated


early ambulation


pain mgmt


discharge once cleared by surgery





HPI/ROS


Admit Date/Time


Admit Date/Time








ROS





Patient is a 31-year-old male who presented to the ER with sharp abd pain since 

afternoon and NBNB vomiting. He is found with acute appendicitis and is now s/p 

Laparoscopic appendectomy. Initial WBC ~ 14,000 and he was afebrile. 


.





PMH/Family/Social


Past Medical History


Medical History:  no pertinent history





Past Surgical History


Past Surgical Hx:  no surgical history





Social History


Smoking Status:  Never smoker





Exam/Review of Systems


Vital Signs


Vitals





 Vital Signs








  Date Time  Temp Pulse Resp B/P Pulse Ox O2 Delivery O2 Flow Rate FiO2


 


6/6/17 20:45 98.2  18 113/65 99 Room Air  


 


6/6/17 20:30  68      


 


6/6/17 19:32       2.0 














 Intake and Output   


 


 6/6/17 6/6/17 6/7/17





 15:00 23:00 07:00


 


Intake Total 1100 ml 1400 ml 550 ml


 


Output Total  10 ml 


 


Balance 1100 ml 1390 ml 550 ml











Exam


Constitutional:  alert, oriented, well developed


Head:  atraumatic, normocephalic


Eyes:  EOMI, PERRL


Respiratory:  clear to auscultation, normal air movement


Cardiovascular:  nl pulses, regular rate and rhythm


Gastrointestinal:  non-tender, soft, surgical scars


Extremities:  normal pulses





Labs


Result Diagram:  


6/7/17 0518                                                                    

            6/6/17 0630








Medications


Medications





 Current Medications


Ondansetron HCl (Zofran Inj) 4 mg Q6H  PRN IV NAUSEA AND/OR VOMITING;  Start 6/6 /17 at 13:00


Acetaminophen (Tylenol Tab) 650 mg Q6H  PRN PO PAIN LEVEL 1-3 OR FEVER Last 

administered on 6/6/17at 23:51; Admin Dose 650 MG;  Start 6/6/17 at 13:00


Acetaminophen (Tylenol Supp) 650 mg Q6H  PRN IA PAIN LEVEL 1-3 OR FEVER;  Start 

6/6/17 at 13:00


Docusate Sodium (Colace) 100 mg Q12H  PRN PO CONSTIPATION;  Start 6/6/17 at 13:

00


Bisacodyl 10 mg 10 mg DAILY  PRN IA CONSTIPATION;  Start 6/6/17 at 13:00


Potassium Chloride/Dextrose/ Sod Cl (D5-1/2ns + KCl 20 Meq) 1,000 ml @  100 mls/

hr Q10H IV  Last administered on 6/6/17at 21:41; Admin Dose 100 MLS/HR;  Start 6 /6/17 at 20:03


Acetaminophen/ Hydrocodone Bitart (Norco (5/325)) 1 tab Q4H  PRN PO PAIN LEVEL 4

-7;  Start 6/6/17 at 20:30


Acetaminophen/ Hydrocodone Bitart (Norco (5/325)) 2 tab Q4H  PRN PO PAIN LEVEL 7

-10;  Start 6/6/17 at 20:30


Hydromorphone HCl (Dilaudid) 0.5 mg Q2  PRN IV PAIN;  Start 6/6/17 at 20:30


Hydromorphone HCl (Dilaudid) 1 mg Q2  PRN IV PAIN;  Start 6/6/17 at 20:30


Docusate Sodium (Colace) 100 mg BID  PRN PO CONSTIPATION;  Start 6/6/17 at 20:30


Bisacodyl (Dulcolax Supp) 10 mg BID  PRN IA CONSTIPATION;  Start 6/6/17 at 20:30


Sodium Biphosphate/ Sodium Phosphate (Fleet Enema) 133 ml BID  PRN IA 

CONSTIPATION;  Start 6/6/17 at 20:30


Famotidine (Pepcid Iv) 20 mg DAILY IV ;  Start 6/7/17 at 09:00


Enoxaparin Sodium (Lovenox) 40 mg DAILY SC ;  Start 6/7/17 at 09:00











GLORIA LEARY MD Jun 7, 2017 07:04

## 2017-06-09 NOTE — PN
Date/Time of Note


Date/Time of Note


DATE: 6/7/17 


TIME: 17:56





Assessment/Plan


Lines/Catheters


IV Catheter Type (from Nrsg):  Saline Lock





Assessment/Plan


Assessment/Plan








[facility] Progress Note (late entry)





Date of Service: 6/7/17





Today's Impression & Plan:





Overall doing well post op without major issues. No major wound problems. Ok to 

d/c home from my standpoint.





With above assessment, I've recommended the following for today:





1. D/c home


2. Instructions: Please call [phone] if any of fever, nausea, vomiting, 

discharge from wound, wound redness, increase or sudden pain, blood in stool or 

vomit, or any other unusual signs or symptoms.





Also, please call the same number in a few days to schedule an appointment for 

your follow up visit. 





Patient may remove dressings tomorrow. Showers OK starting tomorrow. No swimming

, hot tub or bath for 2 weeks. No lifting more than 25 lbs for 8 weeks.








Thank you again for your great care of this very pleasant patient and wonderful 

family. If there are any questions, please feel free to call me at [phone].


 


TOTAL VISIT TIME: 20 minutes of which more than half was spent in face-to-face 

discussion with the patient, possibly including family, as well as coordination 

of care between multiple physicians and providers.





Disclaimer: Inadvertent spelling or grammatical errors are likely due to EHR/

dictation software use and do not reflect on the overall quality of patient 

care.





Updated Clinical Summary:





Updated clinical summary:





Very pleasant 31-year-old gentleman with comorbidities including BMI 38.9, s/p 

lap appy at at [facility] on 6/6/2017 for acute non-

perforated appendicitis. 





Comorbidities:





1.  BMI 30.9


2.  Former smoker


3.  Status post left elbow surgery more than 20 years ago


4.  Status post 2 other surgeries (minor)


5.  S/p lap appy at at [facility] on 6/6/2017 for acute non-

perforated appendicitis





Subjective: 





No major events or complaints; no abd pain and under control with medications; 

no n/v/d; no sob or cp; + flatus; + BM and normal; + activity





Objective:





Vitals: See below





Exam: 





GENERAL: On exam, the patient was standing in his room and appeared to be 

comfortable and in no acute distress. 





ABDOMEN: Soft, nontender and nondistended. Incision dressings are clean, dry 

and intact without any evidence of obvious underlying erythema, edema, discharge

, or hernia. There are no peritoneal signs or guarding. 





SKIN: Skin appears to be pink and feels warm to touch. 





NEUROLOGIC: Patient is awake, alert, and follows commands appropriately.





Exam/Review of Systems


Vital Signs


Vitals





 Vital Signs








  Date Time  Temp Pulse Resp B/P Pulse Ox O2 Delivery O2 Flow Rate FiO2


 


6/7/17 07:58 97.3 78 18 122/64 95   


 


6/6/17 20:45      Room Air  


 


6/6/17 19:32       2.0 











Results


Result Diagram:  


6/7/17 0518                                                                    

            6/7/17 0518














JAYCEE LUIS M.D. Jun 9, 2017 17:59

## 2018-04-10 ENCOUNTER — HOSPITAL ENCOUNTER (EMERGENCY)
Dept: HOSPITAL 91 - FTE | Age: 33
Discharge: HOME | End: 2018-04-10
Payer: COMMERCIAL

## 2018-04-10 ENCOUNTER — HOSPITAL ENCOUNTER (EMERGENCY)
Age: 33
Discharge: HOME | End: 2018-04-10

## 2018-04-10 DIAGNOSIS — R51: ICD-10-CM

## 2018-04-10 DIAGNOSIS — M43.6: Primary | ICD-10-CM

## 2018-04-10 LAB
ANION GAP: 19 (ref 8–16)
BLOOD UREA NITROGEN: 17 MG/DL (ref 7–20)
CALCIUM: 9.7 MG/DL (ref 8.4–10.2)
CARBON DIOXIDE: 21 MMOL/L (ref 21–31)
CHLORIDE: 106 MMOL/L (ref 97–110)
CREATININE: 0.65 MG/DL (ref 0.61–1.24)
GLUCOSE: 137 MG/DL (ref 70–220)
POTASSIUM: 4.5 MMOL/L (ref 3.5–5.1)
SODIUM: 141 MMOL/L (ref 135–144)

## 2018-04-10 PROCEDURE — 70498 CT ANGIOGRAPHY NECK: CPT

## 2018-04-10 PROCEDURE — 70450 CT HEAD/BRAIN W/O DYE: CPT

## 2018-04-10 PROCEDURE — 70496 CT ANGIOGRAPHY HEAD: CPT

## 2018-04-10 PROCEDURE — 80048 BASIC METABOLIC PNL TOTAL CA: CPT

## 2018-04-10 PROCEDURE — 96372 THER/PROPH/DIAG INJ SC/IM: CPT

## 2018-04-10 PROCEDURE — 99285 EMERGENCY DEPT VISIT HI MDM: CPT

## 2018-04-10 PROCEDURE — 96374 THER/PROPH/DIAG INJ IV PUSH: CPT

## 2018-04-10 RX ADMIN — HYDROMORPHONE HYDROCHLORIDE 1 MG: 1 INJECTION, SOLUTION INTRAMUSCULAR; INTRAVENOUS; SUBCUTANEOUS at 14:40

## 2018-04-10 RX ADMIN — IOHEXOL 1 ML: 350 INJECTION, SOLUTION INTRAVENOUS at 16:34

## 2018-04-10 RX ADMIN — ONDANSETRON 1 MG: 4 TABLET, ORALLY DISINTEGRATING ORAL at 13:24

## 2018-04-10 RX ADMIN — DIAZEPAM 1 MG: 5 TABLET ORAL at 13:24

## 2018-04-10 RX ADMIN — HYDROCODONE BITARTRATE AND ACETAMINOPHEN 1 TAB: 10; 325 TABLET ORAL at 13:23

## 2018-04-10 RX ADMIN — SODIUM CHLORIDE 1 ML: 9 INJECTION, SOLUTION INTRAMUSCULAR; INTRAVENOUS; SUBCUTANEOUS at 16:33

## 2018-04-10 RX ADMIN — LIDOCAINE HYDROCHLORIDE 1 MLS/HR: 10 INJECTION, SOLUTION EPIDURAL; INFILTRATION; INTRACAUDAL; PERINEURAL at 14:36

## 2018-04-10 RX ADMIN — IOHEXOL 1: 350 INJECTION, SOLUTION INTRAVENOUS at 16:34

## 2018-04-10 RX ADMIN — VASOPRESSIN 1: 20 INJECTION, SOLUTION INTRAMUSCULAR; SUBCUTANEOUS at 16:34

## 2018-04-10 RX ADMIN — KETOROLAC TROMETHAMINE 1 MG: 30 INJECTION, SOLUTION INTRAMUSCULAR at 13:24

## 2018-04-10 RX ADMIN — ONDANSETRON HYDROCHLORIDE 1 MG: 2 INJECTION, SOLUTION INTRAMUSCULAR; INTRAVENOUS at 14:43
